# Patient Record
Sex: FEMALE | Race: OTHER | URBAN - METROPOLITAN AREA
[De-identification: names, ages, dates, MRNs, and addresses within clinical notes are randomized per-mention and may not be internally consistent; named-entity substitution may affect disease eponyms.]

---

## 2022-04-11 ENCOUNTER — EMERGENCY (EMERGENCY)
Facility: HOSPITAL | Age: 32
LOS: 1 days | Discharge: HOME | End: 2022-04-11
Attending: EMERGENCY MEDICINE | Admitting: EMERGENCY MEDICINE
Payer: MEDICAID

## 2022-04-11 VITALS
TEMPERATURE: 98 F | SYSTOLIC BLOOD PRESSURE: 110 MMHG | DIASTOLIC BLOOD PRESSURE: 74 MMHG | RESPIRATION RATE: 18 BRPM | HEART RATE: 94 BPM | OXYGEN SATURATION: 98 %

## 2022-04-11 VITALS
DIASTOLIC BLOOD PRESSURE: 79 MMHG | SYSTOLIC BLOOD PRESSURE: 121 MMHG | HEART RATE: 92 BPM | TEMPERATURE: 98 F | WEIGHT: 149.91 LBS | OXYGEN SATURATION: 98 % | RESPIRATION RATE: 20 BRPM

## 2022-04-11 LAB
ALBUMIN SERPL ELPH-MCNC: 4.4 G/DL — SIGNIFICANT CHANGE UP (ref 3.3–5)
ALP SERPL-CCNC: 57 U/L — SIGNIFICANT CHANGE UP (ref 40–120)
ALT FLD-CCNC: 6 U/L — LOW (ref 10–45)
ANION GAP SERPL CALC-SCNC: 10 MMOL/L — SIGNIFICANT CHANGE UP (ref 5–17)
AST SERPL-CCNC: 14 U/L — SIGNIFICANT CHANGE UP (ref 10–40)
BASOPHILS # BLD AUTO: 0.02 K/UL — SIGNIFICANT CHANGE UP (ref 0–0.2)
BASOPHILS NFR BLD AUTO: 0.2 % — SIGNIFICANT CHANGE UP (ref 0–2)
BILIRUB SERPL-MCNC: 0.6 MG/DL — SIGNIFICANT CHANGE UP (ref 0.2–1.2)
BUN SERPL-MCNC: 7 MG/DL — SIGNIFICANT CHANGE UP (ref 7–23)
CALCIUM SERPL-MCNC: 9.1 MG/DL — SIGNIFICANT CHANGE UP (ref 8.4–10.5)
CHLORIDE SERPL-SCNC: 102 MMOL/L — SIGNIFICANT CHANGE UP (ref 96–108)
CO2 SERPL-SCNC: 25 MMOL/L — SIGNIFICANT CHANGE UP (ref 22–31)
CREAT SERPL-MCNC: 0.66 MG/DL — SIGNIFICANT CHANGE UP (ref 0.5–1.3)
CULTURE RESULTS: SIGNIFICANT CHANGE UP
EGFR: 118 ML/MIN/1.73M2 — SIGNIFICANT CHANGE UP
EOSINOPHIL # BLD AUTO: 0.04 K/UL — SIGNIFICANT CHANGE UP (ref 0–0.5)
EOSINOPHIL NFR BLD AUTO: 0.5 % — SIGNIFICANT CHANGE UP (ref 0–6)
GLUCOSE SERPL-MCNC: 114 MG/DL — HIGH (ref 70–99)
HCT VFR BLD CALC: 39.9 % — SIGNIFICANT CHANGE UP (ref 34.5–45)
HGB BLD-MCNC: 13.4 G/DL — SIGNIFICANT CHANGE UP (ref 11.5–15.5)
IMM GRANULOCYTES NFR BLD AUTO: 0.2 % — SIGNIFICANT CHANGE UP (ref 0–1.5)
LYMPHOCYTES # BLD AUTO: 0.51 K/UL — LOW (ref 1–3.3)
LYMPHOCYTES # BLD AUTO: 6.3 % — LOW (ref 13–44)
MCHC RBC-ENTMCNC: 29.3 PG — SIGNIFICANT CHANGE UP (ref 27–34)
MCHC RBC-ENTMCNC: 33.6 GM/DL — SIGNIFICANT CHANGE UP (ref 32–36)
MCV RBC AUTO: 87.1 FL — SIGNIFICANT CHANGE UP (ref 80–100)
MONOCYTES # BLD AUTO: 0.39 K/UL — SIGNIFICANT CHANGE UP (ref 0–0.9)
MONOCYTES NFR BLD AUTO: 4.8 % — SIGNIFICANT CHANGE UP (ref 2–14)
NEUTROPHILS # BLD AUTO: 7.18 K/UL — SIGNIFICANT CHANGE UP (ref 1.8–7.4)
NEUTROPHILS NFR BLD AUTO: 88 % — HIGH (ref 43–77)
NRBC # BLD: 0 /100 WBCS — SIGNIFICANT CHANGE UP (ref 0–0)
PLATELET # BLD AUTO: 272 K/UL — SIGNIFICANT CHANGE UP (ref 150–400)
POTASSIUM SERPL-MCNC: 3.7 MMOL/L — SIGNIFICANT CHANGE UP (ref 3.5–5.3)
POTASSIUM SERPL-SCNC: 3.7 MMOL/L — SIGNIFICANT CHANGE UP (ref 3.5–5.3)
PROT SERPL-MCNC: 6.9 G/DL — SIGNIFICANT CHANGE UP (ref 6–8.3)
RBC # BLD: 4.58 M/UL — SIGNIFICANT CHANGE UP (ref 3.8–5.2)
RBC # FLD: 11.5 % — SIGNIFICANT CHANGE UP (ref 10.3–14.5)
SARS-COV-2 RNA SPEC QL NAA+PROBE: NEGATIVE — SIGNIFICANT CHANGE UP
SODIUM SERPL-SCNC: 137 MMOL/L — SIGNIFICANT CHANGE UP (ref 135–145)
SPECIMEN SOURCE: SIGNIFICANT CHANGE UP
WBC # BLD: 8.16 K/UL — SIGNIFICANT CHANGE UP (ref 3.8–10.5)
WBC # FLD AUTO: 8.16 K/UL — SIGNIFICANT CHANGE UP (ref 3.8–10.5)

## 2022-04-11 PROCEDURE — 80053 COMPREHEN METABOLIC PANEL: CPT

## 2022-04-11 PROCEDURE — 36415 COLL VENOUS BLD VENIPUNCTURE: CPT

## 2022-04-11 PROCEDURE — 87507 IADNA-DNA/RNA PROBE TQ 12-25: CPT

## 2022-04-11 PROCEDURE — 87635 SARS-COV-2 COVID-19 AMP PRB: CPT

## 2022-04-11 PROCEDURE — 99284 EMERGENCY DEPT VISIT MOD MDM: CPT

## 2022-04-11 PROCEDURE — 99283 EMERGENCY DEPT VISIT LOW MDM: CPT

## 2022-04-11 PROCEDURE — 85025 COMPLETE CBC W/AUTO DIFF WBC: CPT

## 2022-04-11 RX ORDER — SODIUM CHLORIDE 9 MG/ML
1000 INJECTION INTRAMUSCULAR; INTRAVENOUS; SUBCUTANEOUS ONCE
Refills: 0 | Status: COMPLETED | OUTPATIENT
Start: 2022-04-11 | End: 2022-04-11

## 2022-04-11 RX ADMIN — SODIUM CHLORIDE 1000 MILLILITER(S): 9 INJECTION INTRAMUSCULAR; INTRAVENOUS; SUBCUTANEOUS at 08:54

## 2022-04-11 RX ADMIN — SODIUM CHLORIDE 1000 MILLILITER(S): 9 INJECTION INTRAMUSCULAR; INTRAVENOUS; SUBCUTANEOUS at 10:35

## 2022-04-11 NOTE — ED PROVIDER NOTE - CARE PROVIDERS DIRECT ADDRESSES
,DirectAddress_Unknown,[email or web address]

## 2022-04-11 NOTE — ED PROVIDER NOTE - NSFOLLOWUPINSTRUCTIONS_ED_ALL_ED_FT
Follow up with GI recommended below.    Eat bland diet - avoid fats, spicy: eat broth, toast, rice, bananas.    Return to ED with worsening abdominal pain or large amounts of bleeding, weakness, feeling of passing out.        Lower Gastrointestinal Bleeding       Lower gastrointestinal (GI) bleeding is the result of bleeding from the colon, the rectum, or the area of the opening of the buttocks (anal area). The colon is the last part of the digestive tract, where stool (feces) is formed. A person with lower GI bleeding may see blood in or on his or her stool. The blood may be bright red.    Lower GI bleeding often stops without treatment. Continued or heavy bleeding may be life-threatening and needs emergency treatment at a hospital.      What are the causes?    This condition may be caused by:  •Diverticulosis. This condition causes pouches to form in your colon over time.      •Diverticulitis. This is inflammation in areas where diverticulosis has occurred.      •Inflammatory bowel disease (IBD), or inflammation of the colon.      •Hemorrhoids, or swollen veins in the rectum.      •Anal fissures, or painful tears in the anus. Tears are often caused by passing hard stools.      •Cancer of the colon or rectum, or polyps of the colon or rectum. Polyps are noncancerous growths.      •Coagulopathy. This is a disorder that makes it hard to form blood clots and causes easy bleeding.      •Arteriovenous malformation. This is an abnormal weakening of a blood vessel where an artery and a vein come together.        What increases the risk?    The following factors may make you more likely to develop this condition:  •Being older than age 60.      •Regular use of aspirin or NSAIDs, such as ibuprofen.      •Taking blood thinners (anticoagulants) or anti-platelet medicines.      •Having a history of high-dose X-ray treatment (radiation therapy) of the colon.      •Having recently had a colon polyp removed.      •Heavy use of alcohol, or use of nicotine products.        What are the signs or symptoms?    Symptoms of this condition include:  •Bright red blood or blood clots coming from your rectum.      •Bloody stools.      •Black or maroon-colored stools.      •Pain or cramping in your abdomen.      •Weakness or dizziness.      •Racing heartbeat.        How is this diagnosed?    This condition may be diagnosed based on:  •Your symptoms and medical history.      •A physical exam. During the exam, your health care provider will check for signs of blood loss, such as low blood pressure and a fast pulse.    •Tests or procedures. These may include:  •Flexible sigmoidoscopy. In this procedure, a flexible tube with a camera on the end is used to examine your anus and the first part of your colon to look for the source of bleeding.      •Colonoscopy. This is similar to a flexible sigmoidoscopy, but the camera can extend all the way to the uppermost part of your colon.      •Blood tests to measure your red blood cell count and to check for coagulopathy.      •Angiogram. For this test, X-rays of your colon are taken after a dye or radioactive substance is injected into your bloodstream. This may be done to look for a bleeding site.          How is this treated?    Treatment for this condition depends on the cause of your bleeding. Heavy or ongoing bleeding is treated at a hospital. Treatment may include:  •Getting fluids through an IV inserted into one of your veins.      •Getting blood through an IV (blood transfusion).      •Stopping bleeding with high heat (coagulation), injections of certain medicines, or surgical clips. This can all be done during a colonoscopy.      •Having a procedure that involves first doing an angiogram and then blocking blood flow to the bleeding site (embolization).      •Stopping some of your regular medicines for a certain amount of time.      •Having surgery to remove part of your colon. This may be needed if bleeding is severe and does not lessen after other treatment.        Follow these instructions at home:      Eating and drinking      •Eat foods that are high in fiber. This will help keep your stools soft. These foods include whole grains, legumes, fruits, and vegetables. Eating 1–3 prunes a day works well for many people.      •Drink enough fluid to keep your urine pale yellow.      • Do not drink alcohol.        General instructions      • Do not use any products that contain nicotine or tobacco, such as cigarettes, e-cigarettes, and chewing tobacco. If you need help quitting, ask your health care provider.      •Take over-the-counter and prescription medicines only as told by your health care provider. You may need to avoid aspirin, NSAIDs, or other medicines that increase bleeding.      •Return to your normal activities as told by your health care provider. Ask your health care provider what activities are safe for you.      •Keep all follow-up visits as told by your health care provider. This is important.        Contact a health care provider if:    •Your symptoms do not improve.      •You feel nauseous, or you vomit.      •You have pain in your abdomen.      •You feel weak or dizzy.      •You need help to stop smoking or drinking alcohol.        Get help right away if:    •Your bleeding increases.      •You have severe weakness or you faint.      •You have sudden or severe cramps in your back or abdomen.      •You vomit blood.      •You pass large blood clots in your stool.      •Any of your other symptoms get worse.      These symptoms may represent a serious problem that is an emergency. Do not wait to see if the symptoms will go away. Get medical help right away. Call your local emergency services (911 in the [place] Do not drive yourself to the hospital.       Summary    •If you have lower gastrointestinal (GI) bleeding, you may see blood in or on your stool (feces). The blood may be bright red.      •Take over-the-counter and prescription medicines only as told by your health care provider. You may need to avoid aspirin, NSAIDs, or other medicines that increase bleeding.      •Keep all follow-up visits as told by your health care provider. This is important.      •Get help right away if your symptoms get worse.      This information is not intended to replace advice given to you by your health care provider. Make sure you discuss any questions you have with your health care provider.      Document Revised: 12/12/2020 Document Reviewed: 12/12/2020    [employer] Patient Education © 2022 [employer]

## 2022-04-11 NOTE — ED ADULT NURSE NOTE - OBJECTIVE STATEMENT
Patient presents to ED c/o constipation for 1 week with abdominal pain, diarrhea x2, and rectal bleeding last night. Patient states that she saw blood in the toilet and on the tissue. Patient denies any new foods or medications, nausea, vomiting, fever, SOB, or chills at this time. No PMH and patient currently on menstrual. Patient resting in bed in no acute distress at this time.

## 2022-04-11 NOTE — ED PROVIDER NOTE - CLINICAL SUMMARY MEDICAL DECISION MAKING FREE TEXT BOX
33 y/o F w/ intermittent constipation and diarrhea w/ associated bloody stool and rectal bleeding. Vital signs stable. Plan for basic labs including WBC count and Hgb. Symptoms consistent w/ inflammatory colitis vs. infectious process causing bloody diarrhea. Will send stool sample for GI PCR. Give IV fluids. Offered CT scan but pt declined at this time. 31 y/o F w/ intermittent constipation and diarrhea w/ associated bloody stool and rectal bleeding. Vital signs stable. Plan for basic labs including WBC count and Hgb. Symptoms consistent w/ inflammatory colitis vs. infectious process causing bloody diarrhea. Will send stool sample for GI PCR. Give IV fluids. Offered CT scan but pt declined at this time.    Pt observed in ED for multiple hours - small episodes of only blood rectally - no stool - unable to obtain stool sample.  Pt states amount of blood has improved.    Pt aware to return with worsening bleeding, large amount of  bleeding, worsening abd pain.  Otherwise will follow up with GI as outpt.

## 2022-04-11 NOTE — ED PROVIDER NOTE - CARE PROVIDER_API CALL
Jolene Noguera)  Medicine  [address], [address] [place] [address]  Phone: [phone]  Fax: [phone]  Follow Up Time:     Niko Rosa)  Gastroenterology; Internal Medicine  [address], [address] [place] [address]  Phone: [phone]  Fax: [phone]  Follow Up Time:

## 2022-04-11 NOTE — ED ADULT TRIAGE NOTE - CHIEF COMPLAINT QUOTE
"I have been having pain to my stomach since eleven last night. I also have been bleeding rectally. I have not been able to go to the bathroom fo almost a week now."

## 2022-04-11 NOTE — ED PROVIDER NOTE - PATIENT PORTAL LINK FT
You can access the FollowMyHealth Patient Portal offered by [facility] by registering at the following website: [email or web address]. By joining [employer]’s FollowMyHealth portal, you will also be able to view your health information using other applications (apps) compatible with our system.

## 2022-04-11 NOTE — ED PROVIDER NOTE - OBJECTIVE STATEMENT
31 y/o healthy F, w/ intermittent constipation over the last week and 2 episodes of diarrhea last night. Reports associated intermittent bloody stool and episodes of small bright red rectal bleeding. Never had these symptoms before. Endorses abd pain before diarrhea but otherwise no abd pain. No surgical Hx. No Hx of IBD. No FHx of IBD. No nausea, vomiting. Did not take meds for constipation. No recent travels. No fever or chills.

## 2022-04-14 DIAGNOSIS — Z20.822 CONTACT WITH AND (SUSPECTED) EXPOSURE TO COVID-19: ICD-10-CM

## 2022-04-14 DIAGNOSIS — R19.7 DIARRHEA, UNSPECIFIED: ICD-10-CM

## 2022-04-14 DIAGNOSIS — K62.5 HEMORRHAGE OF ANUS AND RECTUM: ICD-10-CM

## 2022-04-14 DIAGNOSIS — K59.00 CONSTIPATION, UNSPECIFIED: ICD-10-CM

## 2022-04-14 DIAGNOSIS — R10.9 UNSPECIFIED ABDOMINAL PAIN: ICD-10-CM

## 2024-05-06 PROBLEM — Z78.9 OTHER SPECIFIED HEALTH STATUS: Chronic | Status: ACTIVE | Noted: 2022-04-11

## 2024-05-16 ENCOUNTER — APPOINTMENT (OUTPATIENT)
Dept: DERMATOLOGY | Facility: CLINIC | Age: 34
End: 2024-05-16
Payer: COMMERCIAL

## 2024-05-16 VITALS — HEIGHT: 61 IN

## 2024-05-16 VITALS — BODY MASS INDEX: 29.1 KG/M2 | WEIGHT: 154 LBS

## 2024-05-16 DIAGNOSIS — D22.9 MELANOCYTIC NEVI, UNSPECIFIED: ICD-10-CM

## 2024-05-16 DIAGNOSIS — L21.9 SEBORRHEIC DERMATITIS, UNSPECIFIED: ICD-10-CM

## 2024-05-16 DIAGNOSIS — L20.9 ATOPIC DERMATITIS, UNSPECIFIED: ICD-10-CM

## 2024-05-16 DIAGNOSIS — L82.1 OTHER SEBORRHEIC KERATOSIS: ICD-10-CM

## 2024-05-16 PROBLEM — Z00.00 ENCOUNTER FOR PREVENTIVE HEALTH EXAMINATION: Status: ACTIVE | Noted: 2024-05-16

## 2024-05-16 PROCEDURE — 99204 OFFICE O/P NEW MOD 45 MIN: CPT

## 2024-05-16 RX ORDER — TACROLIMUS 1 MG/G
0.1 OINTMENT TOPICAL
Qty: 1 | Refills: 1 | Status: ACTIVE | COMMUNITY
Start: 2024-05-16 | End: 1900-01-01

## 2024-05-16 RX ORDER — KETOCONAZOLE 20.5 MG/ML
2 SHAMPOO, SUSPENSION TOPICAL
Qty: 1 | Refills: 5 | Status: ACTIVE | COMMUNITY
Start: 2024-05-16 | End: 1900-01-01

## 2024-05-16 RX ORDER — CLOBETASOL PROPIONATE 0.5 MG/ML
0.05 SOLUTION TOPICAL
Qty: 1 | Refills: 4 | Status: ACTIVE | COMMUNITY
Start: 2024-05-16 | End: 1900-01-01

## 2024-05-16 NOTE — PHYSICAL EXAM
[Alert] : alert [Oriented x 3] : ~L oriented x 3 [Full Body Skin Exam Performed] : performed [FreeTextEntry3] : PE:   General: well-appearing, alert, in no acute distress   Full body skin exam performed examining scalp, head, face, ears, eyes, mouth, neck, chest, back, abdomen, axilla, b/l arms, b/l forearms, b/l hands, b/l fingernails, b/l thighs, b/l legs, b/l feet, b/l toenails Pertinent findings include: -groin/genitalia/buttocks exam deferred -scattered light brown to dark brown colored <6mm papules and macules without any irregular border, color, or asymmetry on the trunk and extremities, consistent with benign nevi. -brown stuck on papules, plaques on the trunk and extremities -minimally scaly pink thin plaque on R temple -scaly patches on scalp

## 2024-05-16 NOTE — HISTORY OF PRESENT ILLNESS
Referred by: Cristopher Leon MD; Medical Diagnosis (from order):    Diagnosis Information      Diagnosis    724.5 (ICD-9-CM) - M54.9 (ICD-10-CM) - Back pain, unspecified back location, unspecified back pain laterality, unspecified chronicity                Discharge Summary -  Daily Treatment Note    Visit:  4     SUBJECTIVE                                                                                                               Patient states they are feeling better compared to last session. Has not had any pain with daily activities and feels he is ready to continue long-term home exercise program independently.  Functional Change: Improved tolerance to lifting external objects  Improved tolerance to daily activities  Current functional limitations: None, per patient    OBJECTIVE                                                                                                                         Global Rating of Change (GROC) Outcome: Score: +6 \"A great deal better\"   - MCID of 2 points.  Score interpretation: Scores range from \"A very great deal worse\" (-7) to \" A very great deal better\" (+7) with a self-selected score of 0 reflecting no change.     Plank Endurance Test: 1 minute 45 seconds    Outcome Measures:   OSWESTRY Total Scored: 4  OSWESTRY Total Possible Score: 50  OSWESTRY Score Calculated: 8 %  (0-20% = minimal disability; 20-40% = moderate disability; 40-60% = severe disability; 60-80% = crippled; % = bed bound) see flowsheet for additional documentation  TREATMENT                                                                                                                  Therapeutic Exercise:  Stationary bike at level 4.0 resistance - 5 minutes    Rojo's carry progressions   - 40# dumbbell x 40' each upper extremity (lateral carry)   - 45# dumbbell x 40' each upper extremity (lateral carry)   - 25# dumbbell x 80' (anterior carry)    Hip hinge progressions   - 8 reps with 10#  [FreeTextEntry1] : tbse, eczema dumbbell   - 8 reps with 20# dumbbell    Thoracic isotonics with cable machine   - 5 reps each direction with 7# on cable    Skilled input: verbal instruction/cues and tactile instruction/cues    Writer verbally educated and received verbal consent for hand placement, positioning of patient, and techniques to be performed today from patient for clothing adjustments for techniques, therapist position for techniques and hand placement and palpation for techniques as described above and how they are pertinent to the patient's plan of care.    Home Exercise Program/Education Materials: Access Code: [email or web address]  URL: [email or web address]  Date: 06/02/2022  Prepared by: Constantin Pugh    Program Notes  WITH ALL EXERCISES: Aim to end at a 6-8 out of 10 in terms of how hard you feel you are working      Exercises  Standard Plank - 1 x daily - 3 x weekly - 3 sets - 1 reps - 60-90 hold  Anti-Rotation Sidestepping with Resistance - 1 x daily - 3 x weekly - 3 sets - 5 reps - 5 hold  Farmer's Walk - 1 x daily - 3 x weekly - 3 sets - 1 reps  Kettlebell Deadlift - 1 x daily - 3 x weekly - 3 sets - 6 reps  Seated Thoracic Lumbar Extension - 2-3 x daily - 7 x weekly - 1 sets - 5 reps - 10 hold     ASSESSMENT                                                                                                             Therapist observed improved activity tolerance during today's session. Patient reporting no pain with daily activity or with exercises today. Education regarding importance of continued long-term adherence to home exercise program was provided today and patient demonstrated understanding. Patient discharged this session due to meeting personal and therapy goals.  To date the patient has made gains as expected as reported.  Patient Education:   Results of above outlined education: Verbalizes understanding and Demonstrates understanding      PLAN                                                                                                                            Discharge from skilled therapy with instructions/recommendations to continue home exercise program    Suggestions for next session as indicated: Patient discharged      GOALS                                                                                                                           Long Term Goals: to be met by end of plan of care  1. Patient will report pain level of 2/10 with activity in order to tolerate activities of daily living including lifting external loads as necessary for work. Status: met   2. Patient will demonstrate independence in understanding of and ability to perform HEP in order to maximize rehab potential. Status: met   3. Patient will report no more than 10% on Oswestry to objectively demonstrate improved functional capacity and reduced pain. Status: met   4. Patient will report no increase in pain throughout work day including lifting loads of at least 50 pounds to demonstrate improved activity tolerance.  Status: met       Therapy procedure time and total treatment time can be found documented on the Time Entry flowsheet   [de-identified] : 23yo F presents for tbse No personal or family hx of skin cancer No new, changing, growing, bleeding, concerning skin lesions noticed also with ongoing eczema, itchy patch on the R temple/forehead, has used desonide w/o improvement also with itchy scalp

## 2024-05-16 NOTE — ASSESSMENT
[FreeTextEntry1] : #Eczema - R temple -chronic, flaring -I have discussed the chronic nature and course of this condition -tried desonide w/o improvement  -start tacrolimus 0.1% to AA BID, SED including burning, which usually subsides after initial use (within 5 days, apply vaseline first), avoid applying to any open areas. Also discussed boxed warning, -gentle skin care, liberal emollients reviewed  #Seborrheic dermatitis, scalp, chronic, flaring -I discussed the chronic nature and course of this condition -Start Ketoconazole 2% shampoo 2-3 times a week to the scalp. Leave on for at least 5 mins before rinsing out. Potential SE reviewed including dryness, irritation. Alternate with head and shoulders or DHS zinc shampoo. If not covered, can use otc Nizoral shampoo. -Start clobetasol solution daily to AA until improved then as needed, SED, do not get on face.  #Multiple benign nevi - chronic, stable - I discussed the chronic nature and course of the condition - Photoprotection discussed, recommend daily broad-spectrum sunscreen, SPF 30 or greater, UPF hat, clothing. - Pt educated on ABCDE of melanoma - Recommend self-skin exam and annual skin exam by MD - Pt instructed to return for new or changing lesions especially if any moles start to change, itch, or bleed   # Seborrheic keratosis, trunk/extremities  - chronic, stable -I discussed the chronic nature and course of the condition -reviewed benign nature  RTC 2-3 months for eczema follow up